# Patient Record
Sex: FEMALE | Race: WHITE
[De-identification: names, ages, dates, MRNs, and addresses within clinical notes are randomized per-mention and may not be internally consistent; named-entity substitution may affect disease eponyms.]

---

## 2022-11-28 ENCOUNTER — HOSPITAL ENCOUNTER (OUTPATIENT)
Dept: HOSPITAL 47 - LABWHC1 | Age: 42
Discharge: HOME | End: 2022-11-28
Attending: FAMILY MEDICINE
Payer: COMMERCIAL

## 2022-11-28 DIAGNOSIS — R03.0: ICD-10-CM

## 2022-11-28 DIAGNOSIS — I34.0: Primary | ICD-10-CM

## 2022-11-28 LAB
ALBUMIN SERPL-MCNC: 4.5 G/DL (ref 3.5–5)
ALBUMIN/GLOB SERPL: 1.6 {RATIO}
ALP SERPL-CCNC: 110 U/L (ref 38–126)
ALT SERPL-CCNC: 15 U/L (ref 4–34)
ANION GAP SERPL CALC-SCNC: 8 MMOL/L
AST SERPL-CCNC: 22 U/L (ref 14–36)
BASOPHILS # BLD AUTO: 0.1 K/UL (ref 0–0.2)
BASOPHILS NFR BLD AUTO: 1 %
BUN SERPL-SCNC: 18 MG/DL (ref 7–17)
CALCIUM SPEC-MCNC: 9.2 MG/DL (ref 8.4–10.2)
CHLORIDE SERPL-SCNC: 101 MMOL/L (ref 98–107)
CO2 SERPL-SCNC: 28 MMOL/L (ref 22–30)
EOSINOPHIL # BLD AUTO: 0.3 K/UL (ref 0–0.7)
EOSINOPHIL NFR BLD AUTO: 3 %
ERYTHROCYTE [DISTWIDTH] IN BLOOD BY AUTOMATED COUNT: 4.74 M/UL (ref 3.8–5.4)
ERYTHROCYTE [DISTWIDTH] IN BLOOD: 14.3 % (ref 11.5–15.5)
GLOBULIN SER CALC-MCNC: 2.8 G/DL
GLUCOSE SERPL-MCNC: 95 MG/DL (ref 74–99)
HCT VFR BLD AUTO: 39.3 % (ref 34–46)
HGB BLD-MCNC: 13.1 GM/DL (ref 11.4–16)
LYMPHOCYTES # SPEC AUTO: 3.8 K/UL (ref 1–4.8)
LYMPHOCYTES NFR SPEC AUTO: 31 %
MCH RBC QN AUTO: 27.5 PG (ref 25–35)
MCHC RBC AUTO-ENTMCNC: 33.2 G/DL (ref 31–37)
MCV RBC AUTO: 83 FL (ref 80–100)
MONOCYTES # BLD AUTO: 0.6 K/UL (ref 0–1)
MONOCYTES NFR BLD AUTO: 5 %
NEUTROPHILS # BLD AUTO: 7.2 K/UL (ref 1.3–7.7)
NEUTROPHILS NFR BLD AUTO: 59 %
PLATELET # BLD AUTO: 256 K/UL (ref 150–450)
POTASSIUM SERPL-SCNC: 3.7 MMOL/L (ref 3.5–5.1)
PROT SERPL-MCNC: 7.3 G/DL (ref 6.3–8.2)
SODIUM SERPL-SCNC: 137 MMOL/L (ref 137–145)
TROPONIN I SERPL-MCNC: 0.01 NG/ML (ref 0–0.03)
WBC # BLD AUTO: 12.1 K/UL (ref 3.8–10.6)

## 2022-11-28 PROCEDURE — 82553 CREATINE MB FRACTION: CPT

## 2022-11-28 PROCEDURE — 85379 FIBRIN DEGRADATION QUANT: CPT

## 2022-11-28 PROCEDURE — 83880 ASSAY OF NATRIURETIC PEPTIDE: CPT

## 2022-11-28 PROCEDURE — 84484 ASSAY OF TROPONIN QUANT: CPT

## 2022-11-28 PROCEDURE — 80053 COMPREHEN METABOLIC PANEL: CPT

## 2022-11-28 PROCEDURE — 36415 COLL VENOUS BLD VENIPUNCTURE: CPT

## 2022-11-28 PROCEDURE — 85025 COMPLETE CBC W/AUTO DIFF WBC: CPT

## 2025-01-12 ENCOUNTER — HOSPITAL ENCOUNTER (OUTPATIENT)
Dept: HOSPITAL 47 - EC | Age: 45
Setting detail: OBSERVATION
LOS: 1 days | Discharge: HOME | End: 2025-01-13
Attending: INTERNAL MEDICINE | Admitting: INTERNAL MEDICINE
Payer: COMMERCIAL

## 2025-01-12 DIAGNOSIS — R73.9: ICD-10-CM

## 2025-01-12 DIAGNOSIS — H81.10: Primary | ICD-10-CM

## 2025-01-12 DIAGNOSIS — Z88.5: ICD-10-CM

## 2025-01-12 DIAGNOSIS — E66.9: ICD-10-CM

## 2025-01-12 DIAGNOSIS — I10: ICD-10-CM

## 2025-01-12 DIAGNOSIS — Z79.899: ICD-10-CM

## 2025-01-12 DIAGNOSIS — Z85.3: ICD-10-CM

## 2025-01-12 PROCEDURE — 96375 TX/PRO/DX INJ NEW DRUG ADDON: CPT

## 2025-01-12 PROCEDURE — 84484 ASSAY OF TROPONIN QUANT: CPT

## 2025-01-12 PROCEDURE — 36415 COLL VENOUS BLD VENIPUNCTURE: CPT

## 2025-01-12 PROCEDURE — 81025 URINE PREGNANCY TEST: CPT

## 2025-01-12 PROCEDURE — 93005 ELECTROCARDIOGRAM TRACING: CPT

## 2025-01-12 PROCEDURE — 99285 EMERGENCY DEPT VISIT HI MDM: CPT

## 2025-01-12 PROCEDURE — 80053 COMPREHEN METABOLIC PANEL: CPT

## 2025-01-12 PROCEDURE — 83605 ASSAY OF LACTIC ACID: CPT

## 2025-01-12 PROCEDURE — 70450 CT HEAD/BRAIN W/O DYE: CPT

## 2025-01-12 PROCEDURE — 85025 COMPLETE CBC W/AUTO DIFF WBC: CPT

## 2025-01-12 PROCEDURE — 96374 THER/PROPH/DIAG INJ IV PUSH: CPT

## 2025-01-13 VITALS — TEMPERATURE: 97.7 F

## 2025-01-13 VITALS — RESPIRATION RATE: 16 BRPM

## 2025-01-13 VITALS — HEART RATE: 63 BPM | SYSTOLIC BLOOD PRESSURE: 152 MMHG | DIASTOLIC BLOOD PRESSURE: 99 MMHG

## 2025-01-13 LAB
ALBUMIN SERPL-MCNC: 4.4 G/DL (ref 3.5–5)
ALP SERPL-CCNC: 95 U/L (ref 38–126)
ALT SERPL-CCNC: 15 U/L (ref 4–34)
ANION GAP SERPL CALC-SCNC: 12 MMOL/L
AST SERPL-CCNC: 24 U/L (ref 14–36)
BASOPHILS # BLD AUTO: 0.1 K/UL (ref 0–0.2)
BASOPHILS NFR BLD AUTO: 1 %
BUN SERPL-SCNC: 21 MG/DL (ref 7–17)
CALCIUM SPEC-MCNC: 9.4 MG/DL (ref 8.4–10.2)
CHLORIDE SERPL-SCNC: 99 MMOL/L (ref 98–107)
CO2 SERPL-SCNC: 26 MMOL/L (ref 22–30)
EOSINOPHIL # BLD AUTO: 0.1 K/UL (ref 0–0.7)
EOSINOPHIL NFR BLD AUTO: 1 %
ERYTHROCYTE [DISTWIDTH] IN BLOOD BY AUTOMATED COUNT: 4.58 M/UL (ref 3.8–5.4)
ERYTHROCYTE [DISTWIDTH] IN BLOOD: 14.6 % (ref 11.5–15.5)
GLUCOSE SERPL-MCNC: 148 MG/DL (ref 74–99)
HCT VFR BLD AUTO: 38.2 % (ref 34–46)
HGB BLD-MCNC: 12.4 GM/DL (ref 11.4–16)
LYMPHOCYTES # SPEC AUTO: 2.2 K/UL (ref 1–4.8)
LYMPHOCYTES NFR SPEC AUTO: 18 %
MCH RBC QN AUTO: 27 PG (ref 25–35)
MCHC RBC AUTO-ENTMCNC: 32.4 G/DL (ref 31–37)
MCV RBC AUTO: 83.4 FL (ref 80–100)
MONOCYTES # BLD AUTO: 0.4 K/UL (ref 0–1)
MONOCYTES NFR BLD AUTO: 3 %
NEUTROPHILS # BLD AUTO: 9 K/UL (ref 1.3–7.7)
NEUTROPHILS NFR BLD AUTO: 76 %
PLATELET # BLD AUTO: 240 K/UL (ref 150–450)
POTASSIUM SERPL-SCNC: 3.7 MMOL/L (ref 3.5–5.1)
PROT SERPL-MCNC: 7.7 G/DL (ref 6.3–8.2)
SODIUM SERPL-SCNC: 137 MMOL/L (ref 137–145)
WBC # BLD AUTO: 11.8 K/UL (ref 3.8–10.6)

## 2025-01-13 RX ADMIN — ENOXAPARIN SODIUM SCH: 40 INJECTION SUBCUTANEOUS at 08:16

## 2025-01-13 RX ADMIN — MECLIZINE STA MG: 12.5 TABLET ORAL at 01:50

## 2025-01-13 RX ADMIN — CEFAZOLIN STA MLS/HR: 330 INJECTION, POWDER, FOR SOLUTION INTRAMUSCULAR; INTRAVENOUS at 01:49

## 2025-01-13 RX ADMIN — ONDANSETRON STA MG: 2 INJECTION INTRAMUSCULAR; INTRAVENOUS at 01:49

## 2025-01-13 RX ADMIN — METOCLOPRAMIDE STA MG: 5 INJECTION, SOLUTION INTRAMUSCULAR; INTRAVENOUS at 03:33

## 2025-01-13 RX ADMIN — CEFAZOLIN STA MLS/HR: 330 INJECTION, POWDER, FOR SOLUTION INTRAMUSCULAR; INTRAVENOUS at 05:35

## 2025-01-13 RX ADMIN — SCOPALAMINE STA PATCH: 1 PATCH, EXTENDED RELEASE TRANSDERMAL at 04:20

## 2025-01-13 RX ADMIN — CEFAZOLIN STA: 330 INJECTION, POWDER, FOR SOLUTION INTRAMUSCULAR; INTRAVENOUS at 05:29

## 2025-01-13 RX ADMIN — MECLIZINE HYDROCHLORIDE SCH MG: 25 TABLET ORAL at 08:15

## 2025-01-13 RX ADMIN — CEFAZOLIN SCH MLS/HR: 330 INJECTION, POWDER, FOR SOLUTION INTRAMUSCULAR; INTRAVENOUS at 05:35

## 2025-01-13 NOTE — CT
EXAM:

  CT Head Without Intravenous Contrast

 

CLINICAL HISTORY:

  ITS.REASON CT Reason: dizziness

 

TECHNIQUE:

  Axial computed tomography images of the head/brain without intravenous 

contrast.  CTDI is 49.2 mGy and DLP is 1139.4 mGy-cm.  This CT exam was 

performed using one or more of the following dose reduction techniques: 

automated exposure control, adjustment of the mA and/or kV according to 

patient size, and/or use of iterative reconstruction technique.

 

COMPARISON:

  No relevant prior studies available.

 

FINDINGS:

  Brain:  No hemorrhage or mass effect.

  Ventricles:  No hydrocephalus.

  Bones/joints:  Unremarkable.

  Soft tissues:  Unremarkable.

  Sinuses:  No air fluid level.

  Mastoid air cells:  Clear.

 

IMPRESSION:     

  No acute hemorrhage, hydrocephalus, or mass effect.

## 2025-01-13 NOTE — ED
Dizziness HPI





- General


Source: patient, family


Mode of arrival: wheelchair


Limitations: no limitations





<Gabrielle Cortez - Last Filed: 01/13/25 04:21>





<Valentin Butterfield - Last Filed: 01/14/25 06:16>





- General


Chief Complaint: Syncope


Stated Complaint: NV


Time Seen by Provider: 01/13/25 00:18





- History of Present Illness


Initial Comments: 


44-year-old female with history of hypertension and remote history of breast 

cancer 13 years ago presenting with chief complaint of dizziness.  Dizziness 

started shortly after waking up this morning.  She states that she feels very 

off balance.  Admits to nausea, no vomiting.  Admits to mild headache.  She is 

also having light sensitivity.  No chest pain or difficulty breathing.  No 

abdominal pain or lower extremity swelling.  No vision loss or double vision.  

No numbness tingling or weakness.  No recent injury or recent illness.


 (Gabrielle Cortez)





- Related Data


                                Home Medications











 Medication  Instructions  Recorded  Confirmed


 


Losartan Potassium [Cozaar] 100 mg PO DAILY 01/13/25 01/13/25


 


hydroCHLOROthiazide [Hydrodiuril] 25 mg PO DAILY 01/13/25 01/13/25








                                  Previous Rx's











 Medication  Instructions  Recorded


 


Meclizine [Antivert] 25 mg PO BID PRN #20 tab 01/13/25


 


Ondansetron Odt [Zofran Odt] 4 mg PO Q8HR PRN #20 tab 01/13/25











                                    Allergies











Allergy/AdvReac Type Severity Reaction Status Date / Time


 


hydromorphone [From Dilaudid] AdvReac  Rash/Hives Verified 01/13/25 07:37














Review of Systems


ROS Other: All systems not noted in ROS Statement are negative.





<Gabrielle Cortez - Last Filed: 01/13/25 04:21>


ROS Other: All systems not noted in ROS Statement are negative.





<Valentin Butterfield - Last Filed: 01/14/25 06:16>


ROS Statement: 


Those systems with pertinent positive or pertinent negative responses have been 

documented in the HPI.








Past Medical History


Past Medical History: Cancer, Hypertension


Additional Past Medical History / Comment(s): CA breast


History of Any Multi-Drug Resistant Organisms: None Reported


Past Surgical History: Breast Surgery


Additional Past Surgical History / Comment(s): double masectomy


Past Psychological History: No Psychological Hx Reported


Smoking Status: Never smoker


Past Alcohol Use History: None Reported


Past Drug Use History: None Reported





<DanaJosé Miguelharvey - Last Filed: 01/13/25 04:21>





General Exam


Limitations: no limitations


General appearance: alert, in no apparent distress


Head exam: Present: atraumatic, normocephalic, normal inspection


Neck exam: Present: normal inspection.  Absent: meningismus


Respiratory exam: Present: normal lung sounds bilaterally.  Absent: respiratory 

distress, wheezes, rales, rhonchi, stridor


Cardiovascular Exam: Present: regular rate, normal rhythm, normal heart sounds. 

Absent: systolic murmur, diastolic murmur, rubs, gallop, clicks


Extremities exam: Absent: pedal edema


Neurological exam: Present: alert, oriented X3


Psychiatric exam: Present: normal affect, normal mood


Skin exam: Present: warm, dry





<Gabrielle Cortez - Last Filed: 01/13/25 04:21>


General appearance: alert, in no apparent distress


Head exam: Present: atraumatic, normocephalic, normal inspection


Eye exam: Present: normal appearance, PERRL, EOMI.  Absent: scleral icterus, 

conjunctival injection, periorbital swelling


ENT exam: Present: normal exam, mucous membranes moist


Neck exam: Present: normal inspection.  Absent: tenderness, meningismus, 

lymphadenopathy


Respiratory exam: Present: normal lung sounds bilaterally.  Absent: respiratory 

distress, wheezes, rales, rhonchi, stridor


Cardiovascular Exam: Present: regular rate, normal rhythm, normal heart sounds. 

Absent: systolic murmur, diastolic murmur, rubs, gallop, clicks


GI/Abdominal exam: Present: soft, normal bowel sounds.  Absent: distended, 

tenderness, guarding, rebound, rigid


Extremities exam: Present: normal inspection, full ROM, normal capillary refill.

 Absent: tenderness, pedal edema, joint swelling, calf tenderness


Back exam: Present: normal inspection


Neurological exam: Present: alert, oriented X3, CN II-XII intact


Psychiatric exam: Present: normal affect, normal mood


Skin exam: Present: warm, dry, intact, normal color.  Absent: rash





<Valentin Butterfield - Last Filed: 01/14/25 06:16>





Course


                                   Vital Signs











  01/13/25 01/13/25 01/13/25





  00:12 01:15 04:20


 


Temperature 97.4 F L  


 


Pulse Rate 68 58 L 69


 


Respiratory 20 18 18





Rate   


 


Blood Pressure 150/95 143/88 153/100


 


O2 Sat by Pulse 98 96 99





Oximetry   














  01/13/25 01/13/25 01/13/25





  07:20 12:04 15:39


 


Temperature 97.7 F  


 


Pulse Rate 69 66 63


 


Respiratory 18 16 16





Rate   


 


Blood Pressure 142/86 144/100 152/99


 


O2 Sat by Pulse 98 99 100





Oximetry   














Medical Decision Making





- Lab Data


Result diagrams: 


                                 01/13/25 00:58





                                 01/13/25 00:58





<Gabrielle Cortez - Last Filed: 01/13/25 04:21>





- Lab Data


Result diagrams: 


                                 01/13/25 00:58





                                 01/13/25 00:58





<Valentin Butterfield - Last Filed: 01/14/25 06:16>





- Medical Decision Making





44 female with initial plan to discharge home coming in for persistent 

vertiginous symptoms and now ataxia patient will be admitted as an 

(Valentin Butterfield)





- Lab Data


                                   Lab Results











  01/13/25 01/13/25 01/13/25 Range/Units





  00:58 00:58 00:58 


 


WBC  11.8 H    (3.8-10.6)  k/uL


 


RBC  4.58    (3.80-5.40)  m/uL


 


Hgb  12.4    (11.4-16.0)  gm/dL


 


Hct  38.2    (34.0-46.0)  %


 


MCV  83.4    (80.0-100.0)  fL


 


MCH  27.0    (25.0-35.0)  pg


 


MCHC  32.4    (31.0-37.0)  g/dL


 


RDW  14.6    (11.5-15.5)  %


 


Plt Count  240    (150-450)  k/uL


 


MPV  8.4    


 


Neutrophils %  76    %


 


Lymphocytes %  18    %


 


Monocytes %  3    %


 


Eosinophils %  1    %


 


Basophils %  1    %


 


Neutrophils #  9.0 H    (1.3-7.7)  k/uL


 


Lymphocytes #  2.2    (1.0-4.8)  k/uL


 


Monocytes #  0.4    (0-1.0)  k/uL


 


Eosinophils #  0.1    (0-0.7)  k/uL


 


Basophils #  0.1    (0-0.2)  k/uL


 


Sodium   137   (137-145)  mmol/L


 


Potassium   3.7   (3.5-5.1)  mmol/L


 


Chloride   99   ()  mmol/L


 


Carbon Dioxide   26   (22-30)  mmol/L


 


Anion Gap   12   mmol/L


 


BUN   21 H   (7-17)  mg/dL


 


Creatinine   0.71   (0.52-1.04)  mg/dL


 


Est GFR (CKD-EPI)AfAm   >90   (>60 ml/min/1.73 sqM)  


 


Est GFR (CKD-EPI)NonAf   >90   (>60 ml/min/1.73 sqM)  


 


Glucose   148 H   (74-99)  mg/dL


 


Plasma Lactic Acid Luis    1.6  (0.7-2.0)  mmol/L


 


Calcium   9.4   (8.4-10.2)  mg/dL


 


Total Bilirubin   0.4   (0.2-1.3)  mg/dL


 


AST   24   (14-36)  U/L


 


ALT   15   (4-34)  U/L


 


Alkaline Phosphatase   95   ()  U/L


 


Troponin I     (0.000-0.034)  ng/mL


 


Total Protein   7.7   (6.3-8.2)  g/dL


 


Albumin   4.4   (3.5-5.0)  g/dL














  01/13/25 Range/Units





  00:58 


 


WBC   (3.8-10.6)  k/uL


 


RBC   (3.80-5.40)  m/uL


 


Hgb   (11.4-16.0)  gm/dL


 


Hct   (34.0-46.0)  %


 


MCV   (80.0-100.0)  fL


 


MCH   (25.0-35.0)  pg


 


MCHC   (31.0-37.0)  g/dL


 


RDW   (11.5-15.5)  %


 


Plt Count   (150-450)  k/uL


 


MPV   


 


Neutrophils %   %


 


Lymphocytes %   %


 


Monocytes %   %


 


Eosinophils %   %


 


Basophils %   %


 


Neutrophils #   (1.3-7.7)  k/uL


 


Lymphocytes #   (1.0-4.8)  k/uL


 


Monocytes #   (0-1.0)  k/uL


 


Eosinophils #   (0-0.7)  k/uL


 


Basophils #   (0-0.2)  k/uL


 


Sodium   (137-145)  mmol/L


 


Potassium   (3.5-5.1)  mmol/L


 


Chloride   ()  mmol/L


 


Carbon Dioxide   (22-30)  mmol/L


 


Anion Gap   mmol/L


 


BUN   (7-17)  mg/dL


 


Creatinine   (0.52-1.04)  mg/dL


 


Est GFR (CKD-EPI)AfAm   (>60 ml/min/1.73 sqM)  


 


Est GFR (CKD-EPI)NonAf   (>60 ml/min/1.73 sqM)  


 


Glucose   (74-99)  mg/dL


 


Plasma Lactic Acid Luis   (0.7-2.0)  mmol/L


 


Calcium   (8.4-10.2)  mg/dL


 


Total Bilirubin   (0.2-1.3)  mg/dL


 


AST   (14-36)  U/L


 


ALT   (4-34)  U/L


 


Alkaline Phosphatase   ()  U/L


 


Troponin I  <0.012  (0.000-0.034)  ng/mL


 


Total Protein   (6.3-8.2)  g/dL


 


Albumin   (3.5-5.0)  g/dL














Disposition


Is patient prescribed a controlled substance at d/c from ED?: No





<Gabrielle Cortez - Last Filed: 01/13/25 04:21>


Is patient prescribed a controlled substance at d/c from ED?: No





<Valentin Butterfield - Last Filed: 01/14/25 06:16>


Clinical Impression: 


 Vertigo, Ataxia





Disposition: ADMITTED AS IP TO THIS HOSP


Condition: Good

## 2025-01-13 NOTE — P.DS
Providers


Date of admission: 


01/13/25 05:11





Expected date of discharge: 01/13/25


Attending physician: 


Nataliia Ray MD





Primary care physician: 


Kalyan Blackburn





[facility] Course: 





Discharge Diagnosis:


Dizziness


Benign paroxysmal positional vertigo


Hypertension


Hyperglycemia





Hospital course:


44-year-old female with hypertension, obesity, history of breast cancer. Patient

coming in for evaluation of sudden onset dizziness.  She reports that she has 

been at her baseline status of health this morning feeling fine she was playing 

a game on her phone then after an hour or 2 when she decided to get out of bed 

she was feeling extremely dizzy denies any ringing voices in the ears any 

headache denies any vision changes except from some light sensitivity denies any

fevers chills denies any known sick contacts denies any head trauma or injury. 

She took it easy all day she did not feel like eating any of her meals however 

she felt like her symptoms were getting worse throughout the day started to 

become associated with feeling nauseous anytime she tries to get up and walk 

around she struggled to get to the bathroom at home where she had to crawl back 

to her seat and started throwing up.  After which she decided to come to the 

hospital for evaluation. Again she denies any upper respiratory infection 

symptoms denies any chest pain trouble breathing denies any abdominal pain 

changes in bowel or urinary habits. Patient has an IUD device, denies any 

abnormal vaginal bleeding. Patient denies any street drugs or smoking.  Patient 

admitted to internal medicine floor for further assessment of dizziness.





1/13/2025: Patient seen and examined at bedside.  No acute events overnight.  

She states she is feeling much better this morning.  She reported that she went 

to bathroom earlier, and did not feel as dizzy.  Patient states dizziness occurs

with positional changes especially when she is going from a seated position to 

standing position, and with head rotation.  She states it is 1st time this has 

happened to her.  While admitted patient received Antivert and is being 

discharged with that.  She was given exercises on how to help mitigate vertigo. 

She is to follow-up with her PCP and report back to the ED with any new or 

worsening symptoms.  She is being discharged home.





Pertinent positives and negatives as discussed above, a complete review of 

systems was performed and all other systems are negative.





Vitals: Signs Reviewed





Physical Exam:


General: nontoxic, no distress, appears at stated age


Derm: warm, dry, intact


Head: atraumatic, normocephalic, symmetric


Eyes: EOMI, anicteric sclera, horizontal nystagmus


Mouth: no lip lesion, mucus membranes moist


Cardiovascular:  S1 S2 reg, no murmur, rubs, or gallops


Lungs: CTA bilateral, no rhonchi, no rales, no accessory muscle use


Abdominal: soft, non-tender to palpataion, no appreciable organomegaly


Extremities: no gross muscle atrophy, no edema, no contractures


Neuro: Alert, Oriented, CNII-XII grossly intact, gait normal


Psych: well appearing, appropriate affect





A total of 36 minutes of time were spent preparing this complex discharge 

summary.





Patient was discharge on 1/13/2025 at 16:11.





Keshia Siegel MD


PGY-1 IM





Dictation was produced using dragon dictation software. please excuse any 

grammatical, word or spelling errors.





I have seen and evaluated the patient today.  Discussed with the resident and 

agree with the residents finding and plan as documented in the resident's note. 

Changes highlighted in blue font.


Patient Condition at Discharge: Good





Plan - Discharge Summary


New Discharge Prescriptions: 


New


   Meclizine [Antivert] 25 mg PO BID PRN #20 tab


     PRN Reason: Vertigo


   Ondansetron Odt [Zofran Odt] 4 mg PO Q8HR PRN #20 tab


     PRN Reason: Nausea





Continue


   Losartan Potassium [Cozaar] 100 mg PO DAILY


   hydroCHLOROthiazide [Hydrodiuril] 25 mg PO DAILY


Discharge Medication List





Losartan Potassium [Cozaar] 100 mg PO DAILY 01/13/25 [History]


Meclizine [Antivert] 25 mg PO BID PRN #20 tab 01/13/25 [Rx]


Ondansetron Odt [Zofran Odt] 4 mg PO Q8HR PRN #20 tab 01/13/25 [Rx]


hydroCHLOROthiazide [Hydrodiuril] 25 mg PO DAILY 01/13/25 [History]








Follow up Appointment(s)/Referral(s): 


Kalyan Blackburn MD [Primary Care Provider] - 1-2 days


Patient Instructions/Handouts:  Vertigo (ED)


Activity/Diet/Wound Care/Special Instructions: 


Follow-up with your PCP.  Report back to ER with any new or worsening symptoms.


Discharge Disposition: HOME SELF-CARE

## 2025-01-13 NOTE — P.HPIM
History of Present Illness


H&P Date: 01/13/25


Chief Complaint: dizziness


44-year-old female with hypertension, obesity, history of breast cancer





Patient coming in for evaluation of sudden onset dizziness.  She reports that 

she has been at her baseline status of health this morning feeling fine she was 

playing a game on her phone then after an hour or 2 when she decided to get out 

of bed she was feeling extremely dizzy denies any ringing voices in the ears any

headache denies any vision changes except from some light sensitivity denies any

fevers chills denies any known sick contacts denies any head trauma or injury.


She took it easy all day she did not feel like eating any of her meals however 

she felt like her symptoms were getting worse throughout the day started to 

become associated with feeling nauseous anytime she tries to get up and walk 

around she struggled to get to the bathroom at home where she had to crawl back 

to her seat and started throwing up.  After which she decided to come to the 

hospital for evaluation


Again she denies any upper respiratory infection symptoms denies any chest pain 

trouble breathing denies any abdominal pain changes in bowel or urinary habits





Patient has an IUD device, denies any abnormal vaginal bleeding.





Patient denies any street drugs or smoking








review of systems


Pertinent positives as noted in HPI. All other systems were reviewed and are 

negative








on exam


Constitutional:          No acute distress, conversant, pleasant


Eyes:      Anicteric sclerae, moist conjunctiva, 


         Pupils equal round reactive to light





ENMT:      NC/AT


         Oropharynx clear, no erythema, or exudates














Lungs:      Clear to auscultation


         Clear to percussion


         Normal respiratory effort, no accessory muscle use 





Cardiovascular:      Heart regular in rate and rhythm, 


         No murmurs, gallops, or rubs


         No peripheral edema





Abdominal:       Soft


         Nontender, no guarding, rebound or rigidity


         Abdomen moving with respiration


         Normoactive bowel sounds


   








Extremities:      No digital cyanosis 


         No clubbing


         Pedal pulses intact and symmetrical


         Radial pulses intact and symmetrical 


         No calf tenderness 





Psychiatric:      Alert and oriented to person, place and time


         Appropriate affect


         fair judgement   


      


Neuro      Muscles Strength 5/5 in all 4 extremities , no nystagmus


         Sensation to light touch grossly present throughout


         Cranial nerves II-XII grossly intact 





Past Medical History


Past Medical History: Cancer, Hypertension


Additional Past Medical History / Comment(s): CA breast


History of Any Multi-Drug Resistant Organisms: None Reported


Past Surgical History: Breast Surgery


Additional Past Surgical History / Comment(s): double masectomy


Past Psychological History: No Psychological Hx Reported


Smoking Status: Never smoker


Past Alcohol Use History: None Reported


Past Drug Use History: None Reported





Medications and Allergies


                                Home Medications











 Medication  Instructions  Recorded  Confirmed  Type


 


Meclizine [Antivert] 25 mg PO BID PRN #20 tab 01/13/25  Rx


 


Ondansetron Odt [Zofran Odt] 4 mg PO Q8HR PRN #20 tab 01/13/25  Rx








                                    Allergies











Allergy/AdvReac Type Severity Reaction Status Date / Time


 


hydromorphone [From Dilaudid] AdvReac  Rash/Hives Verified 01/13/25 00:13














Physical Exam


Vitals: 


                                   Vital Signs











  Temp Pulse Resp BP Pulse Ox


 


 01/13/25 01:15   58 L  18  143/88  96


 


 01/13/25 00:12  97.4 F L  68  20  150/95  98








                                Intake and Output











 01/12/25 01/12/25 01/13/25





 14:59 22:59 06:59


 


Other:   


 


  Weight   106.594 kg














Results


CBC & Chem 7: 


                                 01/13/25 00:58





                                 01/13/25 00:58


Labs: 


                  Abnormal Lab Results - Last 24 Hours (Table)











  01/13/25 01/13/25 Range/Units





  00:58 00:58 


 


WBC  11.8 H   (3.8-10.6)  k/uL


 


Neutrophils #  9.0 H   (1.3-7.7)  k/uL


 


BUN   21 H  (7-17)  mg/dL


 


Glucose   148 H  (74-99)  mg/dL














Assessment and Plan


Assessment: 





44-year-old female with hypertension, obesity, history of breast cancer coming 

in for sudden onset dizziness I discussed case with ED doctor and accepted the 

admission for neurologic evaluation





Dizziness


CT of the brain no acute intracranial pathology


Fall precautions


Meclizine 25 mg 3 times daily


Neurology evaluation


Check urine pregnancy test


Slightly elevated white count 11.8 afebrile


Cardiac monitoring


Check EKG 


Blood work overall unremarkable hemoglobin 12.4, sodium 137 potassium 3.7 

troponins negative BUN 21 creatinine 0.7 all unremarkable





Hypertension


Verify home medication of losartan





Full code


DVT prophylaxis Lovenox 40 mg subcu daily